# Patient Record
Sex: MALE | Race: WHITE | HISPANIC OR LATINO | URBAN - METROPOLITAN AREA
[De-identification: names, ages, dates, MRNs, and addresses within clinical notes are randomized per-mention and may not be internally consistent; named-entity substitution may affect disease eponyms.]

---

## 2020-03-12 ENCOUNTER — OFFICE VISIT (OUTPATIENT)
Dept: URBAN - METROPOLITAN AREA CLINIC 17 | Facility: CLINIC | Age: 85
End: 2020-03-12
Payer: MEDICARE

## 2020-03-12 DIAGNOSIS — H01.004 BLEPHARITIS OF LEFT UPPER EYELID: ICD-10-CM

## 2020-03-12 DIAGNOSIS — H02.105 ECTROPION OF LEFT LOWER EYELID: Primary | ICD-10-CM

## 2020-03-12 DIAGNOSIS — Z96.1 PRESENCE OF PSEUDOPHAKIA: ICD-10-CM

## 2020-03-12 DIAGNOSIS — H01.001 BLEPHARITIS OF RIGHT UPPER EYELID: ICD-10-CM

## 2020-03-12 DIAGNOSIS — H10.45 OTHER CHRONIC ALLERGIC CONJUNCTIVITIS: ICD-10-CM

## 2020-03-12 DIAGNOSIS — H18.413 ARCUS SENILIS, BILATERAL: ICD-10-CM

## 2020-03-12 DIAGNOSIS — H04.123 DRY EYE SYNDROME OF BILATERAL LACRIMAL GLANDS: ICD-10-CM

## 2020-03-12 PROCEDURE — 92014 COMPRE OPH EXAM EST PT 1/>: CPT | Performed by: OPTOMETRIST

## 2020-03-12 PROCEDURE — 92004 COMPRE OPH EXAM NEW PT 1/>: CPT | Performed by: OPTOMETRIST

## 2020-03-12 RX ORDER — PREDNISOLONE ACETATE 10 MG/ML
1 % SUSPENSION/ DROPS OPHTHALMIC
Qty: 1 | Refills: 0 | Status: INACTIVE
Start: 2020-03-12 | End: 2020-03-18

## 2020-03-12 ASSESSMENT — INTRAOCULAR PRESSURE: OD: 13

## 2020-03-12 NOTE — IMPRESSION/PLAN
Impression: Blepharitis of right upper eyelid: H01.001. Plan: Discussed diagnosis in detail with patient. Discussed treatment options with patient. Reassured patient of current condition and treatment. Will continue to observe condition and or symptoms. Pt will continue lids scrubs, and baby shampoo on both eyelids.

## 2020-03-12 NOTE — IMPRESSION/PLAN
Impression: Ectropion of left lower eyelid: H02.105. Plan: Discussed diagnosis in detail with patient. Reassured patient of current condition and treatment. Will continue to observe condition and or symptoms. Discussed treatment options with patient. Consult recommended [[facility] Surgeon].

## 2020-03-12 NOTE — IMPRESSION/PLAN
Impression: Arcus senilis, bilateral: H18.413. Plan: Discussed diagnosis in detail with patient. No treatment is required at this time. Will continue to observe condition and or symptoms. Patient instructed to call if condition gets worse.

## 2020-03-12 NOTE — IMPRESSION/PLAN
Impression: Other chronic allergic conjunctivitis: H10.45. Plan: Patient educated that symptoms are caused by ocular allergies and that treatment will help alleviate symptoms but that it will not prevent allergies. Patient educated that allergy testing with an allergy specialist may be necessary to identify allergens affecting patient and treat condition. Prescribed Prednisolone acetate 1% QID OU x 7-10 days, pt can use OTC [place] BID OU.

## 2020-03-19 ENCOUNTER — OFFICE VISIT (OUTPATIENT)
Dept: URBAN - METROPOLITAN AREA CLINIC 17 | Facility: CLINIC | Age: 85
End: 2020-03-19
Payer: MEDICARE

## 2020-03-19 DIAGNOSIS — H02.535 EYELID RETRACTION LEFT LOWER EYELID: Primary | Chronic | ICD-10-CM

## 2020-03-19 DIAGNOSIS — H02.532 EYELID RETRACTION RIGHT LOWER EYELID: Chronic | ICD-10-CM

## 2020-03-19 PROCEDURE — 92002 INTRM OPH EXAM NEW PATIENT: CPT | Performed by: OPHTHALMOLOGY

## 2020-03-19 PROCEDURE — 92012 INTRM OPH EXAM EST PATIENT: CPT | Performed by: OPHTHALMOLOGY

## 2020-03-19 ASSESSMENT — INTRAOCULAR PRESSURE
OS: 15
OD: 12

## 2020-03-19 NOTE — IMPRESSION/PLAN
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 OD. MILD Plan: Discussion, orders, and instructions listed in plan #1.

## 2020-03-19 NOTE — IMPRESSION/PLAN
Impression: Eyelid retraction left lower eyelid: H02.535. OS. MIld Symptoms: will continue to monitor. Vision: vision not affected. Plan: Discussed diagnosis in detail with patient. Discussed treatment options with patient. Decrease predacetate to BID x 1 week, then Qday x 1 week then discontinue. Use aquaphoir twice daily and rub into both lower eyelids. Surgery not indicated, will monitor.

## 2021-01-26 ENCOUNTER — OFFICE VISIT (OUTPATIENT)
Dept: URBAN - METROPOLITAN AREA CLINIC 17 | Facility: CLINIC | Age: 86
End: 2021-01-26
Payer: MEDICARE

## 2021-01-26 DIAGNOSIS — H43.811 VITREOUS DEGENERATION, RIGHT EYE: ICD-10-CM

## 2021-01-26 DIAGNOSIS — H11.31 SUBCONJUNCTIVAL HEMORRHAGE OF RIGHT EYE: ICD-10-CM

## 2021-01-26 DIAGNOSIS — H01.009 BLEPHARITIS OF EYELID: ICD-10-CM

## 2021-01-26 PROCEDURE — 99214 OFFICE O/P EST MOD 30 MIN: CPT | Performed by: OPTOMETRIST

## 2021-01-26 RX ORDER — OLOPATADINE HYDROCHLORIDE 2 MG/ML
0.2 % SOLUTION/ DROPS OPHTHALMIC
Qty: 2.5 | Refills: 11 | Status: ACTIVE
Start: 2021-01-26

## 2021-01-26 RX ORDER — PREDNISOLONE ACETATE 10 MG/ML
1 % SUSPENSION/ DROPS OPHTHALMIC
Qty: 10 | Refills: 0 | Status: INACTIVE
Start: 2021-01-26 | End: 2021-01-26

## 2021-01-26 RX ORDER — PREDNISOLONE ACETATE 10 MG/ML
1 % SUSPENSION/ DROPS OPHTHALMIC
Qty: 10 | Refills: 0 | Status: INACTIVE
Start: 2021-01-26 | End: 2022-02-18

## 2021-01-26 RX ORDER — OLOPATADINE HYDROCHLORIDE 2 MG/ML
0.2 % SOLUTION/ DROPS OPHTHALMIC
Qty: 2.5 | Refills: 11 | Status: INACTIVE
Start: 2021-01-26 | End: 2021-01-26

## 2021-01-26 ASSESSMENT — INTRAOCULAR PRESSURE
OD: 10
OS: 10

## 2021-01-26 NOTE — IMPRESSION/PLAN
Impression: Subconjunctival hemorrhage of right eye: H11.31. Plan: Discussed diagnosis in detail with patient. No treatment is required at this time. Will continue to observe condition and or symptoms. Patient instructed to call if condition gets worse.  Order Refraction per patient request.

## 2021-01-26 NOTE — IMPRESSION/PLAN
Impression: Blepharitis of eyelid: H01.009 Bilateral. Plan: Discussed diagnosis in detail with patient. No treatment is required at this time. Will continue to observe condition and or symptoms. Patient instructed to call if condition gets worse.  Order Refraction per patient request.

## 2022-04-14 ENCOUNTER — OFFICE VISIT (OUTPATIENT)
Dept: URBAN - METROPOLITAN AREA CLINIC 17 | Facility: CLINIC | Age: 87
End: 2022-04-14
Payer: MEDICARE

## 2022-04-14 DIAGNOSIS — H35.3132 NONEXUDATIVE AGE-RELATED MACULAR DEGENERATION, BILATERAL, INTERMEDIATE DRY STAGE: Primary | ICD-10-CM

## 2022-04-14 DIAGNOSIS — H43.811 VITREOUS DEGENERATION, RIGHT EYE: ICD-10-CM

## 2022-04-14 DIAGNOSIS — H04.123 DRY EYE SYNDROME OF BILATERAL LACRIMAL GLANDS: ICD-10-CM

## 2022-04-14 DIAGNOSIS — H01.8 OTHER SPECIFIED INFLAMMATIONS OF EYELID: ICD-10-CM

## 2022-04-14 DIAGNOSIS — H10.45 OTHER CHRONIC ALLERGIC CONJUNCTIVITIS: ICD-10-CM

## 2022-04-14 PROCEDURE — 92014 COMPRE OPH EXAM EST PT 1/>: CPT | Performed by: OPTOMETRIST

## 2022-04-14 RX ORDER — OLOPATADINE HYDROCHLORIDE OPHTHALMIC 2 MG/ML
0.2 % SOLUTION OPHTHALMIC
Qty: 2.5 | Refills: 11 | Status: ACTIVE
Start: 2022-04-14

## 2022-04-14 RX ORDER — PREDNISOLONE ACETATE 10 MG/ML
1 % SUSPENSION/ DROPS OPHTHALMIC
Qty: 5 | Refills: 0 | Status: ACTIVE
Start: 2022-04-14

## 2022-04-14 NOTE — IMPRESSION/PLAN
Impression: Nonexudative age-related macular degeneration, bilateral, intermediate dry stage: H35.3132. Plan: Discussed diagnosis in detail with patient. Discussed treatment options with patient. Emphasized and explained compliance pt advised to not Re/start smoking. Recommended to start PreserVision AREDS-2 PO BID. Advised patient of condition. Discussed risks and benefits and patient understands.

## 2022-04-14 NOTE — IMPRESSION/PLAN
Impression: Other chronic allergic conjunctivitis: H10.45. Plan: Patient educated that symptoms are caused by  ocular allergies  and that treatment  will help alleviate symptoms  but that it will  not prevent  allergies. Patient  educated that  allergy testing with  an allergy specialist may  be necessary to identify  allergens affecting  patient and  treat condition. start Olopatadine QD OU for maintenance. May use Opcon A OTC for ocular itch.

## 2022-04-14 NOTE — IMPRESSION/PLAN
Impression: Other specified inflammations of eyelid: H01.8. Plan: Discussed diagnosis in detail with patient and treatment options. Discussed risks and benefits and patient understands. Advised patient of condition. Emphasized and explained compliance of daily lid hygiene with baby shampoo QD on both eyelids for 1x month, then every other day for life.